# Patient Record
Sex: MALE | Race: WHITE | URBAN - METROPOLITAN AREA
[De-identification: names, ages, dates, MRNs, and addresses within clinical notes are randomized per-mention and may not be internally consistent; named-entity substitution may affect disease eponyms.]

---

## 2018-08-15 ENCOUNTER — ANESTHESIA EVENT (OUTPATIENT)
Dept: SURGERY | Facility: CLINIC | Age: 53
End: 2018-08-15

## 2018-08-15 ENCOUNTER — HOSPITAL ENCOUNTER (OUTPATIENT)
Facility: CLINIC | Age: 53
Discharge: HOME | End: 2018-08-15
Attending: OPHTHALMOLOGY | Admitting: OPHTHALMOLOGY

## 2018-08-15 ENCOUNTER — ANESTHESIA (OUTPATIENT)
Dept: SURGERY | Facility: CLINIC | Age: 53
End: 2018-08-15

## 2018-08-15 VITALS
TEMPERATURE: 97.3 F | RESPIRATION RATE: 14 BRPM | DIASTOLIC BLOOD PRESSURE: 86 MMHG | WEIGHT: 190 LBS | HEIGHT: 72 IN | BODY MASS INDEX: 25.73 KG/M2 | OXYGEN SATURATION: 99 % | SYSTOLIC BLOOD PRESSURE: 118 MMHG

## 2018-08-15 PROCEDURE — 40000170 ZZH STATISTIC PRE-PROCEDURE ASSESSMENT II: Performed by: OPHTHALMOLOGY

## 2018-08-15 PROCEDURE — 37000009 ZZH ANESTHESIA TECHNICAL FEE, EACH ADDTL 15 MIN: Performed by: OPHTHALMOLOGY

## 2018-08-15 PROCEDURE — 37000008 ZZH ANESTHESIA TECHNICAL FEE, 1ST 30 MIN: Performed by: OPHTHALMOLOGY

## 2018-08-15 PROCEDURE — 25000125 ZZHC RX 250: Performed by: NURSE ANESTHETIST, CERTIFIED REGISTERED

## 2018-08-15 PROCEDURE — 25000125 ZZHC RX 250: Performed by: OPHTHALMOLOGY

## 2018-08-15 PROCEDURE — 25000128 H RX IP 250 OP 636: Performed by: SURGERY

## 2018-08-15 PROCEDURE — 71000028 ZZH EYE RECOVERY PHASE 2 EACH 15 MINS: Performed by: OPHTHALMOLOGY

## 2018-08-15 PROCEDURE — 25000128 H RX IP 250 OP 636: Performed by: NURSE ANESTHETIST, CERTIFIED REGISTERED

## 2018-08-15 PROCEDURE — 25000132 ZZH RX MED GY IP 250 OP 250 PS 637: Performed by: OPHTHALMOLOGY

## 2018-08-15 PROCEDURE — 36000105 ZZH EYE SURGERY LEVEL 4 EA 15 ADDTL MIN: Performed by: OPHTHALMOLOGY

## 2018-08-15 PROCEDURE — 36000104 ZZH EYE SURGERY LEVEL 4 1ST 30 MIN: Performed by: OPHTHALMOLOGY

## 2018-08-15 PROCEDURE — 25000128 H RX IP 250 OP 636: Performed by: OPHTHALMOLOGY

## 2018-08-15 PROCEDURE — 27211046 ZZH EYE GAS ISPAN SF6: Performed by: OPHTHALMOLOGY

## 2018-08-15 PROCEDURE — 27210794 ZZH OR GENERAL SUPPLY STERILE: Performed by: OPHTHALMOLOGY

## 2018-08-15 PROCEDURE — 27210995 ZZH RX 272: Performed by: OPHTHALMOLOGY

## 2018-08-15 RX ORDER — CYCLOPENTOLATE HYDROCHLORIDE 10 MG/ML
1 SOLUTION/ DROPS OPHTHALMIC
Status: COMPLETED | OUTPATIENT
Start: 2018-08-15 | End: 2018-08-15

## 2018-08-15 RX ORDER — SODIUM CHLORIDE, SODIUM LACTATE, POTASSIUM CHLORIDE, CALCIUM CHLORIDE 600; 310; 30; 20 MG/100ML; MG/100ML; MG/100ML; MG/100ML
INJECTION, SOLUTION INTRAVENOUS CONTINUOUS
Status: DISCONTINUED | OUTPATIENT
Start: 2018-08-15 | End: 2018-08-15 | Stop reason: HOSPADM

## 2018-08-15 RX ORDER — ONDANSETRON 2 MG/ML
INJECTION INTRAMUSCULAR; INTRAVENOUS PRN
Status: DISCONTINUED | OUTPATIENT
Start: 2018-08-15 | End: 2018-08-15

## 2018-08-15 RX ORDER — BRIMONIDINE TARTRATE 2 MG/ML
SOLUTION/ DROPS OPHTHALMIC PRN
Status: DISCONTINUED | OUTPATIENT
Start: 2018-08-15 | End: 2018-08-15 | Stop reason: HOSPADM

## 2018-08-15 RX ORDER — PROPOFOL 10 MG/ML
INJECTION, EMULSION INTRAVENOUS PRN
Status: DISCONTINUED | OUTPATIENT
Start: 2018-08-15 | End: 2018-08-15

## 2018-08-15 RX ORDER — LIDOCAINE HYDROCHLORIDE 20 MG/ML
INJECTION, SOLUTION INFILTRATION; PERINEURAL PRN
Status: DISCONTINUED | OUTPATIENT
Start: 2018-08-15 | End: 2018-08-15

## 2018-08-15 RX ORDER — PHENYLEPHRINE HYDROCHLORIDE 25 MG/ML
1 SOLUTION/ DROPS OPHTHALMIC
Status: COMPLETED | OUTPATIENT
Start: 2018-08-15 | End: 2018-08-15

## 2018-08-15 RX ORDER — DEXAMETHASONE SODIUM PHOSPHATE 4 MG/ML
INJECTION, SOLUTION INTRA-ARTICULAR; INTRALESIONAL; INTRAMUSCULAR; INTRAVENOUS; SOFT TISSUE PRN
Status: DISCONTINUED | OUTPATIENT
Start: 2018-08-15 | End: 2018-08-15

## 2018-08-15 RX ORDER — TIMOLOL MALEATE 5 MG/ML
SOLUTION/ DROPS OPHTHALMIC PRN
Status: DISCONTINUED | OUTPATIENT
Start: 2018-08-15 | End: 2018-08-15 | Stop reason: HOSPADM

## 2018-08-15 RX ORDER — ATROPINE SULFATE 10 MG/ML
SOLUTION/ DROPS OPHTHALMIC PRN
Status: DISCONTINUED | OUTPATIENT
Start: 2018-08-15 | End: 2018-08-15 | Stop reason: HOSPADM

## 2018-08-15 RX ORDER — BALANCED SALT SOLUTION 6.4; .75; .48; .3; 3.9; 1.7 MG/ML; MG/ML; MG/ML; MG/ML; MG/ML; MG/ML
SOLUTION OPHTHALMIC PRN
Status: DISCONTINUED | OUTPATIENT
Start: 2018-08-15 | End: 2018-08-15 | Stop reason: HOSPADM

## 2018-08-15 RX ORDER — ACETAZOLAMIDE 500 MG/1
500 CAPSULE, EXTENDED RELEASE ORAL ONCE
Status: CANCELLED | OUTPATIENT
Start: 2018-08-15 | End: 2018-08-15

## 2018-08-15 RX ORDER — FENTANYL CITRATE 50 UG/ML
INJECTION, SOLUTION INTRAMUSCULAR; INTRAVENOUS PRN
Status: DISCONTINUED | OUTPATIENT
Start: 2018-08-15 | End: 2018-08-15

## 2018-08-15 RX ORDER — DEXAMETHASONE SODIUM PHOSPHATE 4 MG/ML
INJECTION, SOLUTION INTRA-ARTICULAR; INTRALESIONAL; INTRAMUSCULAR; INTRAVENOUS; SOFT TISSUE PRN
Status: DISCONTINUED | OUTPATIENT
Start: 2018-08-15 | End: 2018-08-15 | Stop reason: HOSPADM

## 2018-08-15 RX ORDER — ACETAZOLAMIDE 500 MG/1
500 CAPSULE, EXTENDED RELEASE ORAL ONCE
Status: COMPLETED | OUTPATIENT
Start: 2018-08-15 | End: 2018-08-15

## 2018-08-15 RX ADMIN — CYCLOPENTOLATE HYDROCHLORIDE 1 DROP: 10 SOLUTION/ DROPS OPHTHALMIC at 10:52

## 2018-08-15 RX ADMIN — ACETAZOLAMIDE 500 MG: 500 CAPSULE, EXTENDED RELEASE ORAL at 13:50

## 2018-08-15 RX ADMIN — LIDOCAINE HYDROCHLORIDE 40 MG: 20 INJECTION, SOLUTION INFILTRATION; PERINEURAL at 12:37

## 2018-08-15 RX ADMIN — PHENYLEPHRINE HYDROCHLORIDE 1 DROP: 2.5 SOLUTION/ DROPS OPHTHALMIC at 10:43

## 2018-08-15 RX ADMIN — PHENYLEPHRINE HYDROCHLORIDE 1 DROP: 2.5 SOLUTION/ DROPS OPHTHALMIC at 10:35

## 2018-08-15 RX ADMIN — CYCLOPENTOLATE HYDROCHLORIDE 1 DROP: 10 SOLUTION/ DROPS OPHTHALMIC at 10:43

## 2018-08-15 RX ADMIN — MIDAZOLAM HYDROCHLORIDE 2 MG: 1 INJECTION, SOLUTION INTRAMUSCULAR; INTRAVENOUS at 12:37

## 2018-08-15 RX ADMIN — MIDAZOLAM HYDROCHLORIDE 1 MG: 1 INJECTION, SOLUTION INTRAMUSCULAR; INTRAVENOUS at 12:50

## 2018-08-15 RX ADMIN — ONDANSETRON 4 MG: 2 INJECTION INTRAMUSCULAR; INTRAVENOUS at 12:43

## 2018-08-15 RX ADMIN — DEXMEDETOMIDINE HYDROCHLORIDE 12 MCG: 100 INJECTION, SOLUTION INTRAVENOUS at 12:54

## 2018-08-15 RX ADMIN — PHENYLEPHRINE HYDROCHLORIDE 1 DROP: 2.5 SOLUTION/ DROPS OPHTHALMIC at 10:52

## 2018-08-15 RX ADMIN — PROPOFOL 40 MG: 10 INJECTION, EMULSION INTRAVENOUS at 12:39

## 2018-08-15 RX ADMIN — CYCLOPENTOLATE HYDROCHLORIDE 1 DROP: 10 SOLUTION/ DROPS OPHTHALMIC at 10:35

## 2018-08-15 RX ADMIN — DEXAMETHASONE SODIUM PHOSPHATE 10 MG: 4 INJECTION, SOLUTION INTRA-ARTICULAR; INTRALESIONAL; INTRAMUSCULAR; INTRAVENOUS; SOFT TISSUE at 12:56

## 2018-08-15 RX ADMIN — SODIUM CHLORIDE, POTASSIUM CHLORIDE, SODIUM LACTATE AND CALCIUM CHLORIDE: 600; 310; 30; 20 INJECTION, SOLUTION INTRAVENOUS at 12:22

## 2018-08-15 RX ADMIN — FENTANYL CITRATE 25 MCG: 50 INJECTION, SOLUTION INTRAMUSCULAR; INTRAVENOUS at 12:38

## 2018-08-15 RX ADMIN — DEXMEDETOMIDINE HYDROCHLORIDE 8 MCG: 100 INJECTION, SOLUTION INTRAVENOUS at 12:50

## 2018-08-15 RX ADMIN — PROPOFOL 40 MG: 10 INJECTION, EMULSION INTRAVENOUS at 12:38

## 2018-08-15 ASSESSMENT — LIFESTYLE VARIABLES: TOBACCO_USE: 1

## 2018-08-15 NOTE — OP NOTE
Procedure Date: 08/15/2018      PREOPERATIVE DIAGNOSIS:  Left retinal detachment.      POSTOPERATIVE DIAGNOSIS:  Left retinal detachment.      PROCEDURES:   1.  Left vitrectomy.   2.  Left perfluorocarbon.   3.  Left cryopexy.   4.  Left air-fluid exchange.   5.  Left endolaser.   6.  Left SF6 30% gas-gas exchange.      INDICATIONS:  To reattach the retina.  The risks of the operation including a 1 in 1000 risk of infection, hemorrhage and loss of all eyesight; 1 in 10 risk of retinal detachment requiring more surgery and progression of his cataract, as well as needed position were explained to him.  He wished to proceed.      OPERATIVE DESCRIPTION:  Retrobulbar anesthesia was attained uneventfully.  Following this, the left eye was prepped and draped in the usual fashion.  A wire speculum was inserted in the left fornix.        The eye was entered 4 mm posterior to the limbus in the inferotemporal quadrant with a 23-gauge trocar that was directly visualized in the vitreous cavity and connected to a 1-liter bottle of BSS to which 0.3 mL of 1:1000 adrenalin was added.  Two more 23-gauge trocars were inserted superotemporally and superonasally 4 mm posterior to the limbus.  The eye was entered with a light pipe and a vitreous cutter and a core vitrectomy was performed under wide-angle illumination.  Posterior detachment was already present and the vitreous was removed with 300 mL out to the vitreous base using scleral depression.  Inspection revealed a retinal detachment that extended from the 1 o'clock to the 5 o'clock meridian.  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  They were marked with endodiathermy.  Following this, perfluorocarbon was instilled up to the posterior aspect of the previously-mentioned retinal tears.  Cryopexy was applied around both retinal tears as well as prophylactically around the superotemporal and superonasal sclerotomy sites.  An air-fluid exchange was  performed.  The retina flattened nicely.  Endolaser was applied for 360 degrees along the peripheral vitreous base.  Residual fluid was removed from the posterior pole.  The superonasal trocar was removed and the sclerotomy was closed with 6-0 plain gut suture.  A 30% SF6 gas-gas exchange was performed through the infusion cannula and vented through the superotemporal trocar.  Both remaining trocars were removed and the sclerotomy was closed with 6-0 plain gut suture.  The intraocular pressure was approximately 10.  Subconjunctival injections of Ancef and Decadron were given.  Atropine, Alphagan, Timolol and Betadine were instilled in the left fornix.  The speculum was removed and the eye was patched in the usual fashion.  No complications were noted.         CHIVO MERCADO MD             D: 08/15/2018   T: 08/15/2018   MT: MOHAN      Name:     ILIR LARA   MRN:      [id number]        Account:        [id number]   :      1965           Procedure Date: 08/15/2018      Document: [id number]

## 2018-08-15 NOTE — IP AVS SNAPSHOT
MRN:[id number]                      After Visit Summary   8/15/2018    Amilcar Meneses    MRN: [id number]           Thank you!     Thank you for choosing [facility] for your care. Our goal is always to provide you with excellent care. Hearing back from our patients is one way we can continue to improve our services. Please take a few minutes to complete the written survey that you may receive in the mail after you visit with us. Thank you!        Patient Information     Date Of Birth          1965        About your hospital stay     You were admitted on:  August 15, 2018 You last received care in the:  [facility]    You were discharged on:  August 15, 2018       Who to Call     For medical emergencies, please call 911.  For non-urgent questions about your medical care, please call your primary care provider or clinic, None  For questions related to your surgery, please call your surgery clinic        Attending Provider     Provider Rajesh Gomez MD Ophthalmology       Primary Care Provider    None Specified      Further instructions from your care team       [facility] [address]  [address]  [place], [place] [address] [phone]    PATIENT INSTRUCTIONS - RETINA SURGERY    Common retina operations    Surgery for retinal detachments.    Surgery to remove blood in the eye.    Surgery to remove scar tissue from retina.    Surgery to close macular holes.    Surgery to repair dislocated lens    After the surgery    Many retina operations involve the use of gas bubbles, or oil bubbles in the eye.  If this is true in your case, you may be asked to position a certain way following the surgery.  The nurse will go over this in detail with you.    When you go home, you can eat and drink as much as you feel comfortable.  Many retina operations make you feel less hungry or even a little nauseous.  This is to be  expected.    You will be given eye drops prescriptions to fill that day.  You don t need to start the drops until the next day.  Please bring these drops with you to the doctor.      You may take a bath or shower as usual for you.  If the patch falls off, please simply leave it off.    It is normal to have some moderate discomfort, and nausea.  The doctor may give you pain medication to help with this discomfort.  If nothing was prescribed for you, you can take ibuprofen(Advil) or acetaminopen (Tylenol) as directed on the bottle. If you have significant discomfort that isn t relieved with pain medications, you should call [facility] at [phone] and speak to your doctor.    Recovering after surgery    Retina operations are not like cataract surgery.  The vision often takes weeks or months to fully improve following the surgery.  DO NOT BE DISCOURAGED IF YOU DON T SEE WELL IMMEDIATELY FOLLOWING THE SURGERY.  PATIENTS WITH GAS BUBBLES IN THE EYE CAN T SEE ANY DETAIL AT ALL UNTIL THE GAS BUBBLE RESORBS.      Patients that have a gas/air bubble placed in their eye will have a green medical alert band on their wrist.  This band should not be removed until the doctor removes it for you or gives you permission to remove it.    You cannot fly in an airplane or drive into the mountains as long as the air or gas bubble remains in your eye.    You will have eye drops to use over the first several weeks following the surgery.  The doctor will give you detailed instructions on when to take them on your post-op visit.    If positioning is required following the surgery, it usually is required for 5-7 days.    Most people do not feel able to return to work for at least one week and sometimes up to two or three weeks following the surgery.    Frequently asked questions    What do you mean when you say positioning following retinal surgery?    Gas or oil bubbles are used in retina surgery to either close holes or  help keep the retina attached.  The gas bubbles rises up presses against the highest position of the eye.  Depending on the area of damage to the retina, your doctor may ask you to position on your left side, right side, face down, or upright, or some combination of these positions.  You should try to stay in that position 90% of the time, taking breaks to eat, stretch, go to the bathroom, and have a bath or shower.  You can rent a massage chair that often helps in this position.  At night you should do your best to stay in this position as well.  Your doctor knows how difficult this can be, but can t stress enough how important it is for success of the surgery.  Your doctor will tell you how long this is necessary.    What symptoms should concern me following surgery?    You should be concerned if:    The eye becomes increasingly more painful and the pain medication stops working.    The vision gets darker or dimmer.    Green thick discharge appears.    What are the drops for?    One drop will be an antibiotic.  It is meant to prevent infection.    One drop will be Pred Forte.  It is a steroid drop.  It is meant to reduce inflammation and promote healing.  It usually is continued for the longest time and is gradually tapered over several weeks.    One drop will be atropine.  It dilates the pupil and prevents the iris from going into spasm.  It is usually used for 1-2 weeks.    Many times patients are started on other drops to lower the pressure in the eye.  These are adjusted as needed.  Sometimes, even pills are required to lower the pressure in the eye.        [facility] Discharge  Instructions  Anesthesia (Eye Care Center)   Adult Discharge Instructions    For 24 hours after surgery    1. Get plenty of rest.  Make arrangements to have a responsible adult stay with you for at least 24 hours after you leave the hospital.  2. Do not drive or use heavy equipment for 24 hours.   "  3. Do not drink alcohol for 24 hours.  4. Do not sign legal documents or make important decisions for 24 hours.  5. Avoid strenuous or risky activities. You may feel lightheaded.  If so, sit for a few minutes before standing.  Have someone help you get up.   6. Conscious sedation patients may resume a regular diet..  7. Any questions of medical nature, call your physician.        Pending Results     No orders found from 2018 to 2018.            Admission Information     Date & Time Provider Department Dept. Phone    8/15/2018 Rajesh Barney MD [facility] [phone]      Your Vitals Were     Blood Pressure Temperature Respirations Height Weight Pulse Oximetry    143/95 97.3  F (36.3  C) (Temporal) 16 1.829 m (6') 86.2 kg (190 lb) 97%    BMI (Body Mass Index)                   25.77 kg/m2           MyChart Information     [employer]Auto lets you send messages to your doctor, view your test results, renew your prescriptions, schedule appointments and more. To sign up, go to [email or web address] . Click on \"Log in\" on the left side of the screen, which will take you to the Welcome page. Then click on \"Sign up Now\" on the right side of the page.     You will be asked to enter the access code listed below, as well as some personal information. Please follow the directions to create your username and password.     Your access code is: [email or web address]  Expires: 2018  1:31 PM     Your access code will  in 90 days. If you need help or a new code, please call your [facility] clinic or [phone].        Care EveryWhere ID     This is your Care EveryWhere ID. This could be used by other organizations to access your [facility] medical records  [id number]        Equal Access to Services     [facility] HELIO : Josiah Spencer, waaxda luqadaha, qaybta kaalmada renee, monie peguero. So [facility] [phone].    ATENCIÓN: Si habla español, tiene a parr " disposición servicios gratuitos de asistencia lingüística. Aidan tipton [phone].    We comply with applicable federal civil rights laws and [place] laws. We do not discriminate on the basis of race, color, national origin, age, disability, sex, sexual orientation, or gender identity.               Review of your medicines      UNREVIEWED medicines. Ask your doctor about these medicines        Dose / Directions    ALEVE PO        Dose:  220 mg   Take 220 mg by mouth as needed for moderate pain   Refills:  0       B-12 PO        Refills:  0       IBUPROFEN PO        Dose:  200 mg   Take 200 mg by mouth every 6 hours as needed for moderate pain   Refills:  0       MULTIVITAMIN ADULT PO        Refills:  0       OXYBUTYNIN TD        Refills:  0                Protect others around you: Learn how to safely use, store and throw away your medicines at www.disposemymeds.org.             Medication List: This is a list of all your medications and when to take them. Check marks below indicate your daily home schedule. Keep this list as a reference.      Medications           Morning Afternoon Evening Bedtime As Needed    ALEVE PO   Take 220 mg by mouth as needed for moderate pain                                B-12 PO                                IBUPROFEN PO   Take 200 mg by mouth every 6 hours as needed for moderate pain                                MULTIVITAMIN ADULT PO                                OXYBUTYNIN TD

## 2018-08-15 NOTE — DISCHARGE INSTRUCTIONS
[facility]  [address]  [address]  [place] [address]  [phone]    PATIENT INSTRUCTIONS - RETINA SURGERY    Common retina operations    Surgery for retinal detachments.    Surgery to remove blood in the eye.    Surgery to remove scar tissue from retina.    Surgery to close macular holes.    Surgery to repair dislocated lens    After the surgery    Many retina operations involve the use of gas bubbles, or oil bubbles in the eye.  If this is true in your case, you may be asked to position a certain way following the surgery.  The nurse will go over this in detail with you.    When you go home, you can eat and drink as much as you feel comfortable.  Many retina operations make you feel less hungry or even a little nauseous.  This is to be expected.    You will be given eye drops prescriptions to fill that day.  You don t need to start the drops until the next day.  Please bring these drops with you to the doctor.      You may take a bath or shower as usual for you.  If the patch falls off, please simply leave it off.    It is normal to have some moderate discomfort, and nausea.  The doctor may give you pain medication to help with this discomfort.  If nothing was prescribed for you, you can take ibuprofen(Advil) or acetaminopen (Tylenol) as directed on the bottle. If you have significant discomfort that isn t relieved with pain medications, you should call [facility] at [phone] and speak to your doctor.    Recovering after surgery    Retina operations are not like cataract surgery.  The vision often takes weeks or months to fully improve following the surgery.  DO NOT BE DISCOURAGED IF YOU DON T SEE WELL IMMEDIATELY FOLLOWING THE SURGERY.  PATIENTS WITH GAS BUBBLES IN THE EYE CAN T SEE ANY DETAIL AT ALL UNTIL THE GAS BUBBLE RESORBS.      Patients that have a gas/air bubble placed in their eye will have a green medical alert band on their  wrist.  This band should not be removed until the doctor removes it for you or gives you permission to remove it.    You cannot fly in an airplane or drive into the mountains as long as the air or gas bubble remains in your eye.    You will have eye drops to use over the first several weeks following the surgery.  The doctor will give you detailed instructions on when to take them on your post-op visit.    If positioning is required following the surgery, it usually is required for 5-7 days.    Most people do not feel able to return to work for at least one week and sometimes up to two or three weeks following the surgery.    Frequently asked questions    What do you mean when you say positioning following retinal surgery?    Gas or oil bubbles are used in retina surgery to either close holes or help keep the retina attached.  The gas bubbles rises up presses against the highest position of the eye.  Depending on the area of damage to the retina, your doctor may ask you to position on your left side, right side, face down, or upright, or some combination of these positions.  You should try to stay in that position 90% of the time, taking breaks to eat, stretch, go to the bathroom, and have a bath or shower.  You can rent a massage chair that often helps in this position.  At night you should do your best to stay in this position as well.  Your doctor knows how difficult this can be, but can t stress enough how important it is for success of the surgery.  Your doctor will tell you how long this is necessary.    What symptoms should concern me following surgery?    You should be concerned if:    The eye becomes increasingly more painful and the pain medication stops working.    The vision gets darker or dimmer.    Green thick discharge appears.    What are the drops for?    One drop will be an antibiotic.  It is meant to prevent infection.    One drop will be Pred Forte.  It is a steroid drop.  It is meant to reduce  inflammation and promote healing.  It usually is continued for the longest time and is gradually tapered over several weeks.    One drop will be atropine.  It dilates the pupil and prevents the iris from going into spasm.  It is usually used for 1-2 weeks.    Many times patients are started on other drops to lower the pressure in the eye.  These are adjusted as needed.  Sometimes, even pills are required to lower the pressure in the eye.        [facility] Discharge  Instructions  Anesthesia (Eye Care Center)   Adult Discharge Instructions    For 24 hours after surgery    1. Get plenty of rest.  Make arrangements to have a responsible adult stay with you for at least 24 hours after you leave the hospital.  2. Do not drive or use heavy equipment for 24 hours.    3. Do not drink alcohol for 24 hours.  4. Do not sign legal documents or make important decisions for 24 hours.  5. Avoid strenuous or risky activities. You may feel lightheaded.  If so, sit for a few minutes before standing.  Have someone help you get up.   6. Conscious sedation patients may resume a regular diet..  7. Any questions of medical nature, call your physician.

## 2018-08-15 NOTE — IP AVS SNAPSHOT
[facility]    [address]    [place] [address]    Phone:  [phone]                                       After Visit Summary   8/15/2018    Amilcar Meneses    MRN: [id number]           After Visit Summary Signature Page     I have received my discharge instructions, and my questions have been answered. I have discussed any challenges I see with this plan with the nurse or doctor.    ..........................................................................................................................................  Patient/Patient Representative Signature      ..........................................................................................................................................  Patient Representative Print Name and Relationship to Patient    ..................................................               ................................................  Date                                            Time    ..........................................................................................................................................  Reviewed by Signature/Title    ...................................................              ..............................................  Date                                                            Time

## 2018-08-15 NOTE — ANESTHESIA POSTPROCEDURE EVALUATION
Patient: Amilcar Meneses    Procedure(s):  LEFT EYE 23 GAUGE PARS PLANA  VITRECTOMY , ENDOLASER, CRYOTHERAPY, AIR-FLUID EXCHANGE, INFUSION OF 30% SF6  GAS - Wound Class: I-Clean  CRYOTHERAPY - Wound Class: I-Clean    Diagnosis:retinal detachment  Diagnosis Additional Information: No value filed.    Anesthesia Type:  MAC    Note:  Anesthesia Post Evaluation    Patient location during evaluation: PACU  Patient participation: Able to fully participate in evaluation  Level of consciousness: awake and alert  Pain management: adequate  Airway patency: patent  Cardiovascular status: acceptable and hemodynamically stable  Respiratory status: acceptable and unassisted  Hydration status: acceptable  PONV: none     Anesthetic complications: None          Last vitals:  Vitals:    08/15/18 1045   BP: (!) 143/95   Resp: 16   Temp: 36.3  C (97.3  F)   SpO2: 97%         Electronically Signed By: Kiel Mishra MD  August 15, 2018  1:40 PM

## 2018-08-15 NOTE — OR NURSING
Printed and verbal instructions given to patient Amilcar Meneses and family.  Patient/Family verbalized understanding. No knowledge deficit noted.   Belongings returned to patient/family. Patient assisted to a wheel chair and was  brought to the hospital door by  author  for discharged to home.

## 2018-08-15 NOTE — ANESTHESIA CARE TRANSFER NOTE
Patient: Amilcar Meneses    Procedure(s):  LEFT EYE 23 GAUGE PARS PLANA  VITRECTOMY , ENDOLASER, CRYOTHERAPY, AIR-FLUID EXCHANGE, INFUSION OF 30% SF6  GAS - Wound Class: I-Clean  CRYOTHERAPY - Wound Class: I-Clean    Diagnosis: retinal detachment  Diagnosis Additional Information: No value filed.    Anesthesia Type:   MAC     Note:  Airway :Room Air  Patient transferred to:Phase II  Comments: Patient alert and appropriate. VSS. Report given to RN.Handoff Report: Identifed the Patient, Identified the Reponsible Provider, Reviewed the pertinent medical history, Discussed the surgical course, Reviewed Intra-OP anesthesia mangement and issues during anesthesia, Set expectations for post-procedure period and Allowed opportunity for questions and acknowledgement of understanding      Vitals: (Last set prior to Anesthesia Care Transfer)    CRNA VITALS  8/15/2018 1304 - 8/15/2018 1338      8/15/2018             Ht Rate: 72    Resp Rate (set): 10                Electronically Signed By: DELIA Swann CRNA  August 15, 2018  1:38 PM

## 2018-08-15 NOTE — BRIEF OP NOTE
preop dx - left rd  Post op dx - same  Proc - left ppv, pfc, cryo, afx, el, sf6 30%  Comp - none  eb - zero

## 2018-08-15 NOTE — ANESTHESIA PREPROCEDURE EVALUATION
Procedure: Procedure(s):  VITRECTOMY PARSPLANA WITH 23 GAUGE SYSTEM  Preop diagnosis: retinal detachment    Allergies   Allergen Reactions     Codeine Nausea     Past Medical History:   Diagnosis Date     Retinal detachment      Past Surgical History:   Procedure Laterality Date     ABDOMEN SURGERY      LIPOMA REMOVAL     COLONOSCOPY       ENT SURGERY      TONSILLECTOMY     HERNIA REPAIR       Social History   Substance Use Topics     Smoking status: Current Some Day Smoker     Types: Cigarettes     Smokeless tobacco: Never Used     Alcohol use Yes      Comment: EVERY WEEK     Prior to Admission medications    Medication Sig Start Date End Date Taking? Authorizing Provider   Cyanocobalamin (B-12 PO)    Yes Reported, Patient   IBUPROFEN PO Take 200 mg by mouth every 6 hours as needed for moderate pain   Yes Reported, Patient   Multiple Vitamins-Minerals (MULTIVITAMIN ADULT PO)    Yes Reported, Patient   Naproxen Sodium (ALEVE PO) Take 220 mg by mouth as needed for moderate pain   Yes Reported, Patient   OXYBUTYNIN TD    Yes Reported, Patient     Current Facility-Administered Medications Ordered in Epic   Medication Dose Route Frequency Last Rate Last Dose     lactated ringers infusion   Intravenous Continuous         No current [facility]-ordered outpatient prescriptions on file.       lactated ringers       Wt Readings from Last 1 Encounters:   08/15/18 86.2 kg (190 lb)     Temp Readings from Last 1 Encounters:   08/15/18 36.3  C (97.3  F) (Temporal)     BP Readings from Last 6 Encounters:   08/15/18 (!) 143/95     Pulse Readings from Last 4 Encounters:   No data found for Pulse     Resp Readings from Last 1 Encounters:   08/15/18 16     SpO2 Readings from Last 1 Encounters:   08/15/18 97%     No results for input(s): NA, POTASSIUM, CHLORIDE, CO2, ANIONGAP, GLC, BUN, CR, BRENT in the last 26677 hours.  No results for input(s): AST, ALT, ALKPHOS, BILITOTAL, LIPASE in the last 25688 hours.  No results for input(s): WBC, HGB,  PLT in the last 06606 hours.  No results for input(s): ABO, RH in the last 56638 hours.  No results for input(s): INR, PTT in the last 67220 hours.   No results for input(s): TROPI in the last 93570 hours.  No results for input(s): PH, PCO2, PO2, HCO3 in the last 99764 hours.  No results for input(s): HCG in the last 60057 hours.  No results found for this or any previous visit (from the past 744 hour(s)).    RECENT LABS:   ECG:   ECHO:      Anesthesia Evaluation     . Pt has had prior anesthetic. Type: General    No history of anesthetic complications          ROS/MED HX    ENT/Pulmonary:     (+)tobacco use, Current use , . .    Neurologic:       Cardiovascular:         METS/Exercise Tolerance:     Hematologic:         Musculoskeletal:         GI/Hepatic:         Renal/Genitourinary:         Endo:         Psychiatric:         Infectious Disease:         Malignancy:         Other:                     Physical Exam  Normal systems: cardiovascular, pulmonary and dental    Airway   Mallampati: I  TM distance: >3 FB  Neck ROM: full    Dental     Cardiovascular       Pulmonary                     Anesthesia Plan      History & Physical Review  History and physical reviewed and following examination; no interval change.    ASA Status:  2 .    NPO Status:  > 8 hours    Plan for MAC   PONV prophylaxis:  Ondansetron (or other 5HT-3)       Postoperative Care      Consents  Anesthetic plan, risks, benefits and alternatives discussed with:  Patient..                          .

## (undated) DEVICE — TAPE MICROPORE 1"X1.5YD 1530S-1

## (undated) DEVICE — GLOVE PROTEXIS MICRO 7.0  2D73PM70

## (undated) DEVICE — EYE NDL RETROBULBAR 25GA 1.5" 581275

## (undated) DEVICE — EYE SOL BSS 500ML

## (undated) DEVICE — LINEN TOWEL PACK X5 5464

## (undated) DEVICE — EYE PERFLUORON KIT 5ML 8065900163

## (undated) DEVICE — EYE SHIELD PLASTIC

## (undated) DEVICE — PACK VITRECTOMY PQ15VI57E

## (undated) DEVICE — EYE PACK 23GA CONSTELLATION PPK4254-03

## (undated) DEVICE — EYE CANN SOFT TIP 23GA FOR VALVED SET 8065149527

## (undated) DEVICE — GLOVE PROTEXIS MICRO 6.5  2D73PM65

## (undated) DEVICE — NDL 18GA 1.5" 305196

## (undated) DEVICE — SYR 05ML SLIP TIP W/O NDL

## (undated) DEVICE — GLOVE PROTEXIS MICRO 7.5  2D73PM75

## (undated) DEVICE — SYR 05ML SLIP TIP W/O NDL 309647

## (undated) DEVICE — EYE GAS SF6 PER USE/CC/ML 8065797001

## (undated) DEVICE — DRAPE MICROSCOPE DRAPE  EYE DI40001

## (undated) DEVICE — SU PLAIN 6-0 TG140-8 18" 1735G

## (undated) DEVICE — EYE PROBE LASER 23GA FLEX RFID 8065751113

## (undated) DEVICE — EYE SOL BSS 15ML BOTTLE 65079515

## (undated) DEVICE — EYE DRSG PAD OVAL

## (undated) RX ORDER — TIMOLOL 5 MG/ML
SOLUTION/ DROPS OPHTHALMIC
Status: DISPENSED
Start: 2018-08-15

## (undated) RX ORDER — BRIMONIDINE TARTRATE 2 MG/ML
SOLUTION/ DROPS OPHTHALMIC
Status: DISPENSED
Start: 2018-08-15

## (undated) RX ORDER — ATROPINE SULFATE 10 MG/ML
SOLUTION/ DROPS OPHTHALMIC
Status: DISPENSED
Start: 2018-08-15

## (undated) RX ORDER — ACETAZOLAMIDE 500 MG/1
CAPSULE, EXTENDED RELEASE ORAL
Status: DISPENSED
Start: 2018-08-15

## (undated) RX ORDER — WATER 10 ML/10ML
INJECTION INTRAMUSCULAR; INTRAVENOUS; SUBCUTANEOUS
Status: DISPENSED
Start: 2018-08-15

## (undated) RX ORDER — FENTANYL CITRATE 50 UG/ML
INJECTION, SOLUTION INTRAMUSCULAR; INTRAVENOUS
Status: DISPENSED
Start: 2018-08-15

## (undated) RX ORDER — ONDANSETRON 2 MG/ML
INJECTION INTRAMUSCULAR; INTRAVENOUS
Status: DISPENSED
Start: 2018-08-15

## (undated) RX ORDER — DEXAMETHASONE SODIUM PHOSPHATE 4 MG/ML
INJECTION, SOLUTION INTRA-ARTICULAR; INTRALESIONAL; INTRAMUSCULAR; INTRAVENOUS; SOFT TISSUE
Status: DISPENSED
Start: 2018-08-15

## (undated) RX ORDER — CEFAZOLIN SODIUM 500 MG/2.2ML
INJECTION, POWDER, FOR SOLUTION INTRAMUSCULAR; INTRAVENOUS
Status: DISPENSED
Start: 2018-08-15